# Patient Record
Sex: FEMALE | Race: BLACK OR AFRICAN AMERICAN | Employment: OTHER | URBAN - METROPOLITAN AREA
[De-identification: names, ages, dates, MRNs, and addresses within clinical notes are randomized per-mention and may not be internally consistent; named-entity substitution may affect disease eponyms.]

---

## 2021-09-27 ENCOUNTER — HOSPITAL ENCOUNTER (OUTPATIENT)
Dept: PHYSICAL THERAPY | Age: 61
Discharge: HOME | End: 2021-09-27
Payer: COMMERCIAL

## 2021-09-27 PROCEDURE — 97161 PT EVAL LOW COMPLEX 20 MIN: CPT

## 2021-09-27 PROCEDURE — 97110 THERAPEUTIC EXERCISES: CPT

## 2021-09-27 PROCEDURE — 97535 SELF CARE MNGMENT TRAINING: CPT

## 2021-09-27 NOTE — PROGRESS NOTES
PT DAILY TREATMENT NOTE/LUMBAR EVAL     Patient Name: Tiffany Franks  Date:2021  : 1960  [x]  Patient  Verified  Payor: Praveen Pair / Plan: [facility] [employer] PPO / Product Type: PPO /    In time: 1:30  Out time: 2:15  Total Treatment Time (min): 45  Visit #: 1 of 10    Treatment Area: Low back pain [M54.5]  SUBJECTIVE  Pain Level (0-10 scale): 10  []constant [x]intermittent []improving []worsening [x]no change since onset    Any medication changes, allergies to medications, adverse drug reactions, diagnosis change, or new procedure performed?: [x] No    [] Yes (see summary sheet for update)  Subjective functional status/changes:     PLOF: Patient is I with ADLs and ambulates without use of an AD. Patient stays active performing housework and gardening. Patient also enjoys painting and shopping.    Limitations to PLOF: None  Mechanism of Injury: Patient reports that the back pain began around the time of her mother's death (2021), during which time patient was doing more bending and lifting  Current symptoms/Complaints: Patient endorses low back and piriformis pain (right > left) that is intermittent and sharp   Previous Treatment/Compliance: None  PMHx/Surgical Hx: Patient reports arthritis, weight gain > 10 pounds recently secondary to inactivity   Work Hx: Patient is retired   Living Situation: Patient lives with spouse  Pt Goals: Pain relief, improving functional mobility   Barriers: []pain []financial []time []transportation []other  Motivation: Good   Substance use: []Alcohol []Tobacco []other:   FABQ Score: []low []elevate  Cognition: A & O x 4    Other:    OBJECTIVE/EXAMINATION  Domestic Life: Patient lives with spouse  Activity/Recreational Limitations: Bending, squatting, lifting, reaching, turning over in bed, toileting   Mobility: I, no use of AD; patient reports difficulty with bed mobility  Self Care: Patient reports difficulty with toileting    20 min [x]Eval                  []Re-Eval       15 min Therapeutic Exercise:  [x] See flow sheet :   Rationale: increase ROM, increase strength and improve coordination to improve the patients ability to perform ADLs without limitation from low back pain or radiculopathy    10 min Self-Care / Home Management:  [] See flow sheet :   Therapist educated patient on relevant anatomy, the purpose of exercise interventions, and the importance of managing her symptoms at home through the use of heat or ice          With   [x] TE   [] TA   [] neuro   [] other: Patient Education: [x] Review HEP    [] Progressed/Changed HEP based on:   [] positioning   [] body mechanics   [] transfers   [] heat/ice application    [x] other:      Other Objective/Functional Measures: Difficulty with bed mobility during therapeutic exercises    Physical Therapy Evaluation - Lumbar Spine ([facility])    SUBJECTIVE  Chief Complaint: Low back pain    Mechanism of injury: Insidious onset around the time of patient performing more bending and lifting while caring for her mother     Symptoms:  Aggravated by:   [x] Bending [x] Sitting (prolonged) [x] Standing (prolonged) [x] Walking   [x] Moving [x] Cough [x] Sneeze [] Valsalva   [] AM  [x] PM  Lying:  [] sup   [] pro   [] sidelying   [] Other:     Eased by:    [] Bending [] Sitting [] Standing [] Walking   [] Moving [x] AM  [] PM  Lying: [] sup  [] pro  [] sidelying   [] Other:     General Health:  Red Flags Indicated? [] Yes    [x] No  [x] Yes [] No Recent weight change (If yes, due to dieting?  [] Yes  [] No) - weight gain due to inactivity   [] Yes [x] No Weakness in legs during walking  [x] Yes [] No Unremitting pain at night - a couple of times  [] Yes [x] No Abdominal pain or problems  [] Yes [x] No Rectal bleeding  [] Yes [x] No Feet more cold or painful in cold weather  [] Yes [x] No Menstrual irregularities  [] Yes [x] No Blood or pain with urination  [] Yes [x] No Dysfunction of bowel or bladder  [] Yes [x] No Recent illness within past 3 weeks (i.e, cold, flu)  [] Yes [x] No Numbness/tingling in buttock/genitalia region    Past History/Treatments:     Diagnostic Tests: [] Lab work [] X-rays    [] CT [] MRI     [] Other:  Results:    Functional Status  Prior level of function: See above  Present functional limitations: See above  What position do you sleep in?: Patient prefers sidelying, alternates between right and left    OBJECTIVE    Gait:  [] Normal     [x] Abnormal: antalgic with decreased rachel    Active Movements: [] N/A   [] Too acute   [] Other:  ROM % AROM Comments:pain, area   Forward flexion 40-60 100% of WNL    Extension 20-30 75% of WNL    SB right 20-30 40 cm from ground Painful   SB left 20-30 38 cm from ground    Rotation right 5-10 75% of WNL    Rotation left 5-10 75% of WNL        Dural Mobility:  SLR Sitting: [] R    [] L    [] +    [] -  @ (degrees):           Supine: [x] R    [x] L    [] +    [x] -  @ (degrees):   Slump Test: [x] R    [x] L    [] +    [x] -  @ (degrees):   Prone Knee Bend: [] R    [] L    [] +    [] -     Palpation  [] Min  [] Mod  [x] Severe    Location: TTP about patient's right lumbar paraspinals and right piriformis  [] Min  [] Mod  [] Severe    Location:  [] Min  [] Mod  [] Severe    Location:    Strength   L(0-5) R (0-5) N/T   Hip Flexion (L1,2) 4/5 4-?5 []   Knee Extension (L3,4) 4+/5 4+/5 []   Ankle Dorsiflexion (L4) 5/5 5/5 []   Great Toe Extension (L5)   [x]   Ankle Plantarflexion (S1) 5/5 5/5 []   Knee Flexion (S1,2) 5/5 5/5 []        Pain Level (0-10 scale) post treatment: 2/10    ASSESSMENT/Changes in Function: See POC    Patient will continue to benefit from skilled PT services to modify and progress therapeutic interventions, address functional mobility deficits, address ROM deficits, address strength deficits, analyze and address soft tissue restrictions, analyze and cue movement patterns, analyze and modify body mechanics/ergonomics, assess and modify postural abnormalities and instruct in home and community integration to attain remaining goals.      [x]  See Plan of Care  []  See progress note/recertification  []  See Discharge Summary         Progress towards goals / Updated goals:  See POC    PLAN  [x]  Upgrade activities as tolerated     [x]  Continue plan of care  []  Update interventions per flow sheet       []  Discharge due to:_  []  Other:_      Vesta Houston, PT 9/27/2021  1:27 PM

## 2021-09-27 NOTE — PROGRESS NOTES
[facility] [id number] [facility], [address] [place] [address], [facility] [address] [address]  [phone] fax      Plan of Care/ Statement of Necessity for Physical Therapy Services    Patient name: Dina Sheriff Start of Care: 2021   Referral source: Mally Griggs MD : 1960    Medical Diagnosis: Low back pain [M54.5]  Payor: Jeyson Holguin / Plan: [address] PPO / Product Type: PPO /  Onset Date: 2021   Treatment Diagnosis: Low back pain   Prior Hospitalization: see medical history Provider#: [id number]   Medications: Verified on Patient summary List    Comorbidities: Patient reports arthritis, weight gain > 10 pounds recently (secondary to inactivity)    Prior Level of Function: Patient is I with ADLs and ambulates without use of an AD. Patient stays active performing housework and gardening. Patient also enjoys painting and shopping. The Plan of Care and following information is based on the information from the initial evaluation. Assessment/ key information: Patient is a pleasant 62 y.o. female presenting to PT with complaints of low back pain which began in 2021, around the same time during which patient was performing more bending and lifting while caring for her mother. Patient's LBP impacts her ability to perform bed mobility, toileting, transfers, prolonged sitting and standing, squatting, lifting, and reaching. Patient experienced radicular symptoms (right lower extremity > left lower extremity) weeks ago, however, reports that her radicular symptoms are no longer a concern and that her pain is localized to her low back. Upon physical examination, patient presents with decreased lower extremity and core strength, decreased lumbar spine AROM, impaired bed mobility and transfers, TTP about right-sided lumbar paraspinals and piriformis, and symptoms limiting functional mobility.  Patient will benefit from skilled PT to address these impairments and enhance her ability to participate in ADLs. Evaluation Complexity History MEDIUM  Complexity : 1-2 comorbidities / personal factors will impact the outcome/ POC ; Examination HIGH Complexity : 4+ Standardized tests and measures addressing body structure, function, activity limitation and / or participation in recreation  ;Presentation LOW Complexity : Stable, uncomplicated  ;Clinical Decision Making MEDIUM Complexity : FOTO score of 26-74  Overall Complexity Rating: LOW   Problem List: pain affecting function, decrease ROM, decrease strength, impaired gait/ balance, decrease ADL/ functional abilitiies, decrease activity tolerance, decrease flexibility/ joint mobility and decrease transfer abilities   Treatment Plan may include any combination of the following: Therapeutic exercise, Therapeutic activities, Neuromuscular re-education, Physical agent/modality, Gait/balance training, Manual therapy, Patient education, Self Care training, Functional mobility training, Home safety training and Stair training  Patient / Family readiness to learn indicated by: asking questions, trying to perform skills and interest  Persons(s) to be included in education: patient (P)  Barriers to Learning/Limitations: None  Patient Goal (s): Relieve pain, improve ability to perform daily activities, return to walking as a form of exercise  Patient Self Reported Health Status: good  Rehabilitation Potential: excellent    Short Term Goals: To be accomplished in 2 treatments:  1. Patient will be I with HEP in order to enhance carryover from PT sessions   Evaluation: HEP administered   Long Term Goals: To be accomplished in 10 treatments:  1. Patient will improve FOTO to predicted score of 62 in order to demonstrate improved activity tolerance and less limitation from low back pain    Evaluation: 44  2.  Patient will report 75% improvement in overall symptoms and functional mobility in order to ease ADLs and enhance quality of life   Evaluation: pain rated 2/10 at rest, 8/10 at worst; patient reporting limitation from LBP that affects functional mobility, toileting, bed mobility, and sleep quality  3. Patient will demonstrate full and pain-free AROM of the lumbar spine in order to ease ADLs    Evaluation: flexion WNL, extension 75% of WNL, SB right 40 cm from ground and painful, SB left 38 cm from ground, rotation 75% of WNL bilaterally     Frequency / Duration: Patient to be seen 1 times per week for 10 treatments. Patient/ Caregiver education and instruction: Diagnosis, prognosis, self care, activity modification, exercises and other HEP   [x]  Plan of care has been reviewed with EDDIE Lopez, PT 9/27/2021 3:12 PM  ________________________________________________________________________    I certify that the above Therapy Services are being furnished while the patient is under my care. I agree with the treatment plan and certify that this therapy is necessary.     [de-identified] Signature:____________Date:_________TIME:________     Svetlana Troy MD  ** Signature, Date and Time must be completed for valid certification **      Please sign and return to In [address] [place] [address], [address] [place] [address], [address] [place] [address],[facility] [address]  [phone] fax

## 2021-10-28 NOTE — PROGRESS NOTES
[facility] [address] [address] [facility], [address] [place] [address], [address] [place], [place] [address] [address] [address]  [phone] fax    Discharge Summary    Patient name: Bonita Land     Start of Care: 21  Referral source: Linette Mercado MD    : 1960  Medical/Treatment Diagnosis: Low back pain [M54.5]  Payor: Deepika Ferrell / Plan: [address] PPO / Product Type: PPO /        Onset Date:2021  Prior Hospitalization: see medical history   Provider#: [id number]   Comorbidities: Patient reports arthritis, weight gain > 10 pounds recently (secondary to inactivity)    Prior Level of Function: Patient is I with ADLs and ambulates without use of an AD. Patient stays active performing housework and gardening. Patient also enjoys painting and shopping. Medications: Verified on Patient Summary List    Visits from Start of Care: 1    Missed Visits: 0  Reporting Period : 21 to 21      Summary of Care:patient seen for evaluation only and did not return. Insurance was out of network and she did not return. Thank you. 1. Patient will be I with HEP in order to enhance carryover from PT sessions               Evaluation: HEP administered  Status at discharge: not met    1. Patient will improve FOTO to predicted score of 62 in order to demonstrate improved activity tolerance and less limitation from low back pain                Evaluation: 44  NOT MET  2. Patient will report 75% improvement in overall symptoms and functional mobility in order to ease ADLs and enhance quality of life               Evaluation: pain rated 2/10 at rest, 8/10 at worst; patient reporting limitation from LBP that affects functional mobility, toileting, bed mobility, and sleep quality  NOT MET  3.  Patient will demonstrate full and pain-free AROM of the lumbar spine in order to ease ADLs                Evaluation: flexion WNL, extension 75% of WNL, SB right 40 cm from ground and painful, SB left 38 cm from ground, rotation 75% of WNL bilaterally   NOT MET   ASSESSMENT/RECOMMENDATIONS:  []Discontinue therapy progressing towards or have reached established goals  []Discontinue therapy due to lack of appreciable progress towards goals  [x]Discontinue therapy due to lack of attendance or compliance  [x]Other:no further contact, insurance was Out of network   Thank you for this referral.     Lakisha Walter, PT 10/28/2021 8:37 AM

## 2022-02-03 ENCOUNTER — IMPORTED ENCOUNTER (OUTPATIENT)
Dept: URBAN - METROPOLITAN AREA CLINIC 1 | Facility: CLINIC | Age: 62
End: 2022-02-03

## 2022-02-03 PROBLEM — H16.143: Noted: 2022-02-03

## 2022-02-03 PROBLEM — H25.813: Noted: 2022-02-03

## 2022-02-03 PROBLEM — H04.123: Noted: 2022-02-03

## 2022-02-03 PROCEDURE — 92015 DETERMINE REFRACTIVE STATE: CPT

## 2022-02-03 PROCEDURE — 99204 OFFICE O/P NEW MOD 45 MIN: CPT

## 2022-02-03 NOTE — PATIENT DISCUSSION
1.  Cataract OU - Observe for now without intervention. The patient was advised to contact us if any change or worsening of vision. 2. AUDIE w/ PEK OU - recommend the use of ATs TID OU routinely (sample of Soothe given). MRX given. Return for an appointment in 1 year for 30 with Dr. Ronal Chaidez.

## 2022-04-03 ASSESSMENT — VISUAL ACUITY
OS_CC: 20/200
OD_CC: J1
OS_SC: 20/20
OD_CC: 20/150
OS_GLARE: 20/80
OD_SC: 20/20
OS_CC: J1
OD_GLARE: 20/80

## 2022-04-03 ASSESSMENT — TONOMETRY
OS_IOP_MMHG: 21
OD_IOP_MMHG: 20

## 2023-02-01 RX ORDER — METHOCARBAMOL 750 MG/1
750 TABLET, FILM COATED ORAL 4 TIMES DAILY
COMMUNITY
Start: 2021-09-06

## 2023-02-01 RX ORDER — LIDOCAINE 50 MG/G
PATCH TOPICAL
COMMUNITY
Start: 2021-09-06

## 2023-02-01 RX ORDER — ALBUTEROL SULFATE 90 UG/1
2 AEROSOL, METERED RESPIRATORY (INHALATION) EVERY 4 HOURS PRN
COMMUNITY
Start: 2023-01-10

## 2024-03-04 ENCOUNTER — COMPREHENSIVE EXAM (OUTPATIENT)
Dept: URBAN - METROPOLITAN AREA CLINIC 1 | Facility: CLINIC | Age: 64
End: 2024-03-04

## 2024-03-04 DIAGNOSIS — H25.813: ICD-10-CM

## 2024-03-04 DIAGNOSIS — H16.143: ICD-10-CM

## 2024-03-04 DIAGNOSIS — H04.123: ICD-10-CM

## 2024-03-04 PROCEDURE — 92015 DETERMINE REFRACTIVE STATE: CPT

## 2024-03-04 PROCEDURE — 92014 COMPRE OPH EXAM EST PT 1/>: CPT

## 2024-03-04 ASSESSMENT — VISUAL ACUITY
OD_CC: 20/20-1
OS_CC: J1
OS_BAT: 20/80
OD_CC: J1
OS_CC: 20/20
OD_BAT: 20/80

## 2024-03-04 ASSESSMENT — TONOMETRY
OD_IOP_MMHG: 12
OS_IOP_MMHG: 10

## 2024-07-17 ENCOUNTER — HOSPITAL ENCOUNTER (OUTPATIENT)
Facility: HOSPITAL | Age: 64
Discharge: HOME | End: 2024-07-20
Attending: OBSTETRICS & GYNECOLOGY
Payer: COMMERCIAL

## 2024-07-17 VITALS — BODY MASS INDEX: 45.96 KG/M2 | HEIGHT: 59 IN | WEIGHT: 228 LBS

## 2024-07-17 DIAGNOSIS — Z12.31 BREAST CANCER SCREENING BY MAMMOGRAM: ICD-10-CM

## 2024-07-17 DIAGNOSIS — Z13.820 ENCOUNTER FOR SCREENING FOR OSTEOPOROSIS: ICD-10-CM

## 2024-07-17 PROCEDURE — 77080 DXA BONE DENSITY AXIAL: CPT

## 2024-07-17 PROCEDURE — 77063 BREAST TOMOSYNTHESIS BI: CPT

## 2024-08-16 ENCOUNTER — HOSPITAL ENCOUNTER (OUTPATIENT)
Facility: HOSPITAL | Age: 64
End: 2024-08-16
Attending: OBSTETRICS & GYNECOLOGY
Payer: COMMERCIAL

## 2024-08-16 ENCOUNTER — HOSPITAL ENCOUNTER (OUTPATIENT)
Facility: HOSPITAL | Age: 64
Discharge: HOME | End: 2024-08-16
Attending: OBSTETRICS & GYNECOLOGY
Payer: COMMERCIAL

## 2024-08-16 DIAGNOSIS — R92.8 ABNORMALITY OF RIGHT BREAST ON SCREENING MAMMOGRAPHY: ICD-10-CM

## 2024-08-16 PROCEDURE — G0279 TOMOSYNTHESIS, MAMMO: HCPCS

## 2025-04-07 ENCOUNTER — COMPREHENSIVE EXAM (OUTPATIENT)
Age: 65
End: 2025-04-07

## 2025-04-07 DIAGNOSIS — H04.123: ICD-10-CM

## 2025-04-07 DIAGNOSIS — H25.813: ICD-10-CM

## 2025-04-07 DIAGNOSIS — H16.143: ICD-10-CM

## 2025-04-07 DIAGNOSIS — E11.9: ICD-10-CM

## 2025-04-07 PROCEDURE — 92014 COMPRE OPH EXAM EST PT 1/>: CPT

## 2025-04-07 PROCEDURE — 92015 DETERMINE REFRACTIVE STATE: CPT
